# Patient Record
Sex: MALE | Race: WHITE | Employment: STUDENT | URBAN - METROPOLITAN AREA
[De-identification: names, ages, dates, MRNs, and addresses within clinical notes are randomized per-mention and may not be internally consistent; named-entity substitution may affect disease eponyms.]

---
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
## 2023-08-31 NOTE — PATIENT INSTRUCTIONS
Patient Education        Genital Injury in Boys: Care Instructions  Your Care Instructions     Male genitals can be injured easily. This is because the scrotum and penis are not protected by bones. A genital injury can be very painful. But the pain usually goes away quickly. And in most cases, there is no long-term damage. This type of injury often happens as a result of sports, activities, or falls. Sometimes a little boy might play with toys or other objects near his penis. This can cause an accidental injury. A minor injury can usually be treated at home. Follow-up care is a key part of your child's treatment and safety. Be sure to make and go to all appointments, and call your doctor if your child is having problems. It's also a good idea to know your child's test results and keep a list of the medicines your child takes. How can you care for your child at home? · Have your child rest. He should not do anything that may cause pain or soreness. · Put ice or a cold pack on the area for 10 to 20 minutes at a time. Put a thin cloth between the ice and your child's skin. · Be safe with medicines. Read and follow all instructions on the label. ? If the doctor gave your child a prescription medicine for pain, give it as prescribed. ? If your child is not taking a prescription pain medicine, ask your doctor if your child can take an over-the-counter medicine. When should you call for help? Call your doctor now or seek immediate medical care if:    · Your child has signs of infection, such as:  ? Increased pain, swelling, warmth, or redness. ? Red streaks leading from the area. ? Pus draining from the area. ? A fever.     · Your child has symptoms of a urinary tract infection. These may include:  ? Pain when your child urinates. ? Urinating more often than usual.  ? Blood in your child's urine.     · Your child has new or worse swelling in the scrotum.     · Your child has new or worse pain.    Watch Patient is wanting you to go over her lab work form ER, do you feel she needs a kidney doctor, if so she would like to be referred. Please advise. closely for changes in your child's health, and be sure to contact your doctor if:    · Your child does not get better as expected. Where can you learn more? Go to https://chpeednaeb.Viddsee. org and sign in to your [employer]out account. Enter P230 in the [employer] box to learn more about \"Genital Injury in Boys: Care Instructions. \"     If you do not have an account, please click on the \"Sign Up Now\" link. Current as of: June 26, 2019               Content Version: 12.6  © 1403-1461 [employer]orporated. Care instructions adapted under license by [facility] ([facility]). If you have questions about a medical condition or this instruction, always ask your healthcare professional. [address] any warranty or liability for your use of this information.

## 2025-02-17 ENCOUNTER — LAB (OUTPATIENT)
Dept: PRIMARY CARE CLINIC | Age: 20
End: 2025-02-17

## 2025-02-17 ENCOUNTER — OFFICE VISIT (OUTPATIENT)
Dept: FAMILY MEDICINE CLINIC | Age: 20
End: 2025-02-17
Payer: COMMERCIAL

## 2025-02-17 VITALS
WEIGHT: 194.6 LBS | TEMPERATURE: 97.5 F | HEIGHT: 73 IN | DIASTOLIC BLOOD PRESSURE: 80 MMHG | OXYGEN SATURATION: 97 % | SYSTOLIC BLOOD PRESSURE: 120 MMHG | HEART RATE: 88 BPM | BODY MASS INDEX: 25.79 KG/M2

## 2025-02-17 DIAGNOSIS — J06.9 ACUTE URI: Primary | ICD-10-CM

## 2025-02-17 PROCEDURE — 99203 OFFICE O/P NEW LOW 30 MIN: CPT | Performed by: NURSE PRACTITIONER

## 2025-02-17 PROCEDURE — 1036F TOBACCO NON-USER: CPT | Performed by: NURSE PRACTITIONER

## 2025-02-17 PROCEDURE — G8419 CALC BMI OUT NRM PARAM NOF/U: HCPCS | Performed by: NURSE PRACTITIONER

## 2025-02-17 PROCEDURE — G8427 DOCREV CUR MEDS BY ELIG CLIN: HCPCS | Performed by: NURSE PRACTITIONER

## 2025-02-17 RX ORDER — BENZONATATE 200 MG/1
200 CAPSULE ORAL 3 TIMES DAILY PRN
Qty: 30 CAPSULE | Refills: 0 | Status: SHIPPED | OUTPATIENT
Start: 2025-02-17 | End: 2025-02-27

## 2025-02-17 RX ORDER — DOXYCYCLINE HYCLATE 100 MG
100 TABLET ORAL 2 TIMES DAILY
Qty: 14 TABLET | Refills: 0 | Status: SHIPPED | OUTPATIENT
Start: 2025-02-17 | End: 2025-02-24

## 2025-02-17 SDOH — ECONOMIC STABILITY: FOOD INSECURITY: WITHIN THE PAST 12 MONTHS, THE FOOD YOU BOUGHT JUST DIDN'T LAST AND YOU DIDN'T HAVE MONEY TO GET MORE.: NEVER TRUE

## 2025-02-17 SDOH — ECONOMIC STABILITY: FOOD INSECURITY: WITHIN THE PAST 12 MONTHS, YOU WORRIED THAT YOUR FOOD WOULD RUN OUT BEFORE YOU GOT MONEY TO BUY MORE.: NEVER TRUE

## 2025-02-17 ASSESSMENT — ENCOUNTER SYMPTOMS
ABDOMINAL PAIN: 0
BACK PAIN: 0
VOMITING: 0
DIARRHEA: 0
EYE PAIN: 0
SINUS PAIN: 0
NAUSEA: 0
SORE THROAT: 0
SHORTNESS OF BREATH: 0
SINUS PRESSURE: 1
COUGH: 1

## 2025-02-17 ASSESSMENT — PATIENT HEALTH QUESTIONNAIRE - PHQ9
2. FEELING DOWN, DEPRESSED OR HOPELESS: NOT AT ALL
SUM OF ALL RESPONSES TO PHQ QUESTIONS 1-9: 0
1. LITTLE INTEREST OR PLEASURE IN DOING THINGS: NOT AT ALL
SUM OF ALL RESPONSES TO PHQ QUESTIONS 1-9: 0
SUM OF ALL RESPONSES TO PHQ9 QUESTIONS 1 & 2: 0

## 2025-02-17 NOTE — PROGRESS NOTES
[facility]  [facility]  [address] [facility] [address]  Dept: [phone]  Dept Fax: [phone]    Delano Borges is a 19 y.o. male who presents today for his medicalconditions/complaints as noted below.  Delano Borges is c/o of Cough (Coughing up green mucus x's 1 month ) and New Patient (Establish care. Would like routine labs)      HPI:       19 old male patient presents with concern for new patient appointment    Concerns with cough.  Has had symptoms ongoing for 1 month.  Reports cough is productive of thick green mucus.  Has had nasal congestion rhinorrhea, sinus pressure, ear fullness.  Denies any sore throat.  Diagnoses listed of exercise-induced asthma reports that he never had to use his inhaler.  Patient has been treating with antibiotics, steroids reports no significant improvement thus far.  Reports several family members have been ill as well        Past Medical History:   Diagnosis Date    Asthma, exercise induced     IGT (impaired glucose tolerance)     Leaky heart valve     Seasonal allergies         Current Outpatient Medications   Medication Sig Dispense Refill    doxycycline hyclate (VIBRA-TABS) 100 MG tablet Take 1 tablet by mouth 2 times daily for 7 days 14 tablet 0    benzonatate (TESSALON) 200 MG capsule Take 1 capsule by mouth 3 times daily as needed for Cough 30 capsule 0     No current facility-administered medications for this visit.     No Known Allergies    Subjective:      Review of Systems   Constitutional:  Negative for chills and fatigue.   HENT:  Positive for congestion, ear pain and sinus pressure. Negative for sinus pain and sore throat.    Eyes:  Negative for pain and visual disturbance.   Respiratory:  Positive for cough. Negative for shortness of breath.    Cardiovascular:  Negative for chest pain and palpitations.   Gastrointestinal:  Negative for abdominal pain, diarrhea, nausea and vomiting.   Genitourinary:  Negative for penile

## 2025-02-17 NOTE — PROGRESS NOTES
Visit Information    Have you changed or started any medications since your last visit including any over-the-counter medicines, vitamins, or herbal medicines? no   Have you stopped taking any of your medications? Is so, why? -  no  Are you having any side effects from any of your medications? - no    Have you seen any other physician or provider since your last visit?  no   Have you had any other diagnostic tests since your last visit?  no   Have you been seen in the emergency room and/or had an admission in a hospital since we last saw you?  no   Have you had your routine dental cleaning in the past 6 months?  no     Do you have an active MyChart account? If no, what is the barrier?  Yes    Patient Care Team:  Ritchie Marie APRN - CNP as PCP - General (Certified Nurse Practitioner)    Medical History Review  Past Medical, Family, and Social History reviewed and  contribute to the patient presenting condition    Health Maintenance   Topic Date Due    Depression Screen  Never done    HPV vaccine (1 - Male 3-dose series) Never done    HIV screen  Never done    Hepatitis C screen  Never done    Flu vaccine (1) 08/01/2024    COVID-19 Vaccine (1 - 2024-25 season) Never done    DTaP/Tdap/Td vaccine (7 - Td or Tdap) 12/30/2024    Hepatitis A vaccine  Completed    Hepatitis B vaccine  Completed    Hib vaccine  Completed    Polio vaccine  Completed    Varicella vaccine  Completed    Meningococcal (ACWY) vaccine  Completed    Pneumococcal 0-49 years Vaccine  Completed    Measles,Mumps,Rubella (MMR) vaccine  Discontinued

## 2025-06-24 ENCOUNTER — OFFICE VISIT (OUTPATIENT)
Dept: FAMILY MEDICINE CLINIC | Age: 20
End: 2025-06-24
Payer: COMMERCIAL

## 2025-06-24 ENCOUNTER — HOSPITAL ENCOUNTER (OUTPATIENT)
Dept: ULTRASOUND IMAGING | Age: 20
Discharge: HOME | End: 2025-06-26
Payer: COMMERCIAL

## 2025-06-24 VITALS
OXYGEN SATURATION: 99 % | HEIGHT: 73 IN | WEIGHT: 194 LBS | TEMPERATURE: 97.8 F | HEART RATE: 74 BPM | SYSTOLIC BLOOD PRESSURE: 138 MMHG | DIASTOLIC BLOOD PRESSURE: 80 MMHG | BODY MASS INDEX: 25.71 KG/M2

## 2025-06-24 DIAGNOSIS — N50.812 PAIN IN LEFT TESTICLE: Primary | ICD-10-CM

## 2025-06-24 DIAGNOSIS — R10.30 LOWER ABDOMINAL PAIN: ICD-10-CM

## 2025-06-24 DIAGNOSIS — N50.812 PAIN IN LEFT TESTICLE: ICD-10-CM

## 2025-06-24 LAB
BILIRUBIN, POC: NEGATIVE
BLOOD URINE, POC: NEGATIVE
CLARITY, POC: CLEAR
COLOR, POC: YELLOW
GLUCOSE URINE, POC: NEGATIVE MG/DL
KETONES, POC: NEGATIVE MG/DL
LEUKOCYTE EST, POC: NEGATIVE
NITRITE, POC: NEGATIVE
PH, POC: 7
PROTEIN, POC: 30 MG/DL
SPECIFIC GRAVITY, POC: 1.02
UROBILINOGEN, POC: 1 MG/DL

## 2025-06-24 PROCEDURE — 99213 OFFICE O/P EST LOW 20 MIN: CPT | Performed by: NURSE PRACTITIONER

## 2025-06-24 PROCEDURE — 1036F TOBACCO NON-USER: CPT | Performed by: NURSE PRACTITIONER

## 2025-06-24 PROCEDURE — 93976 VASCULAR STUDY: CPT

## 2025-06-24 PROCEDURE — G8427 DOCREV CUR MEDS BY ELIG CLIN: HCPCS | Performed by: NURSE PRACTITIONER

## 2025-06-24 PROCEDURE — G8419 CALC BMI OUT NRM PARAM NOF/U: HCPCS | Performed by: NURSE PRACTITIONER

## 2025-06-24 PROCEDURE — 81003 URINALYSIS AUTO W/O SCOPE: CPT | Performed by: NURSE PRACTITIONER

## 2025-06-24 ASSESSMENT — ENCOUNTER SYMPTOMS
BACK PAIN: 0
NAUSEA: 0
EYE PAIN: 0
SINUS PAIN: 0
VOMITING: 0
SHORTNESS OF BREATH: 0
COUGH: 0
SORE THROAT: 0
DIARRHEA: 0
ABDOMINAL PAIN: 0

## 2025-06-24 NOTE — PROGRESS NOTES
[facility]  [facility]  [address] [facility] [address]  Dept: [phone]  Dept Fax: [phone]    Delano Borges is a 19 y.o. male who presents today for his medicalconditions/complaints as noted below.  Delano Borges is c/o of Testicle Pain (Bilat testicle  pain has had a dull pain x's 4 years. Within the last couples months pain has gotten worse. Pain does come and go. Pain is mostly on the left side. Pt states his sperm is more watery in the last couple months. )      HPI:       19-year-old male patient presents with concern for scrotal pain.  Patient has had ongoing symptoms.  Patient had an episode in 2021 underwent a scrotal ultrasound that was negative.  Patient reports that he has had improvement overall however over the past few months has seen gradual worsening reports pain to the left posterior testicle.  There is no obvious swelling.  No injury or trauma.  Denies any concerns for STDs reports 1 sexual partner denies any dysuria hematuria or penile discharge.  Patient does report he recently began lifting weights        Past Medical History:   Diagnosis Date    Asthma, exercise induced     IGT (impaired glucose tolerance)     Leaky heart valve     Seasonal allergies         No current outpatient medications on file.     No current facility-administered medications for this visit.     No Known Allergies    Subjective:      Review of Systems   Constitutional:  Negative for chills and fatigue.   HENT:  Negative for congestion, ear pain, sinus pain and sore throat.    Eyes:  Negative for pain and visual disturbance.   Respiratory:  Negative for cough and shortness of breath.    Cardiovascular:  Negative for chest pain and palpitations.   Gastrointestinal:  Negative for abdominal pain, diarrhea, nausea and vomiting.   Genitourinary:  Positive for testicular pain. Negative for penile pain.   Musculoskeletal:  Negative for back pain, joint swelling and neck pain.

## 2025-06-25 ENCOUNTER — RESULTS FOLLOW-UP (OUTPATIENT)
Dept: FAMILY MEDICINE CLINIC | Age: 20
End: 2025-06-25

## 2025-06-25 ENCOUNTER — LAB (OUTPATIENT)
Dept: PRIMARY CARE CLINIC | Age: 20
End: 2025-06-25

## 2025-06-25 DIAGNOSIS — N50.812 PAIN IN LEFT TESTICLE: Primary | ICD-10-CM

## 2025-07-06 ENCOUNTER — RESULTS FOLLOW-UP (OUTPATIENT)
Dept: FAMILY MEDICINE CLINIC | Age: 20
End: 2025-07-06

## 2025-07-09 DIAGNOSIS — N50.82 SCROTAL PAIN: Primary | ICD-10-CM

## 2025-07-22 ENCOUNTER — HOSPITAL ENCOUNTER (OUTPATIENT)
Dept: CT IMAGING | Age: 20
Discharge: HOME | End: 2025-07-24
Payer: COMMERCIAL

## 2025-07-22 DIAGNOSIS — N50.812 PAIN IN LEFT TESTICLE: Primary | ICD-10-CM

## 2025-07-22 DIAGNOSIS — N50.82 SCROTAL PAIN: ICD-10-CM

## 2025-07-22 PROCEDURE — 74176 CT ABD & PELVIS W/O CONTRAST: CPT

## 2025-07-24 ENCOUNTER — TELEPHONE (OUTPATIENT)
Dept: FAMILY MEDICINE CLINIC | Age: 20
End: 2025-07-24

## 2025-07-24 NOTE — TELEPHONE ENCOUNTER
----- Message from Jose DURAN sent at 7/23/2025  3:58 PM EDT -----  Regarding: ECC Results Request  ECC Results Request    Which lab or imaging result is the patient calling about:CT scan    Which provider ordered the test?Ritchie Marie, APRN - CNP    Was this a Non-[facility] Provider: No    Date the test was preformed (JAYLON/ARIC/YYYY): 7/22/2025  --------------------------------------------------------------------------------------------------------------------------    Relationship to Patient: Self     Call Back Info: OK to leave message on voicemail  Preferred Call Back Number: Phone [phone]